# Patient Record
Sex: FEMALE | Race: WHITE | HISPANIC OR LATINO | Employment: FULL TIME | URBAN - METROPOLITAN AREA
[De-identification: names, ages, dates, MRNs, and addresses within clinical notes are randomized per-mention and may not be internally consistent; named-entity substitution may affect disease eponyms.]

---

## 2021-05-17 ENCOUNTER — TELEPHONE (OUTPATIENT)
Dept: OBGYN CLINIC | Facility: CLINIC | Age: 56
End: 2021-05-17

## 2021-05-17 NOTE — TELEPHONE ENCOUNTER
Stephen Michelle started having some brownish vaginal spotting on Saturday 05/15/21,no cramping pains  Her LMP 2019  She is currently in [place] due to her mother passing away on Friday 05/14/21  Shanae Gallardo to schedule an appt  once returns from [place]  Also go to nearest ER,if having heavy bleeding -changing a maxi pad less than every hour,fatigue,light headiness, headaches or any other concerns  Stephen Michelle verbally agreed with plan

## 2021-06-27 DIAGNOSIS — R92.8 BI-RADS CATEGORY 3 MAMMOGRAM RESULT: Primary | ICD-10-CM

## 2021-06-27 PROBLEM — M85.851 OSTEOPENIA OF RIGHT HIP: Status: ACTIVE | Noted: 2021-06-27

## 2021-11-11 PROBLEM — R92.8 BI-RADS CATEGORY 3 MAMMOGRAM RESULT: Status: ACTIVE | Noted: 2021-11-11

## 2021-11-12 ENCOUNTER — ANNUAL EXAM (OUTPATIENT)
Dept: OBGYN CLINIC | Facility: CLINIC | Age: 56
End: 2021-11-12
Payer: COMMERCIAL

## 2021-11-12 VITALS
HEIGHT: 59 IN | WEIGHT: 136.4 LBS | BODY MASS INDEX: 27.5 KG/M2 | DIASTOLIC BLOOD PRESSURE: 64 MMHG | SYSTOLIC BLOOD PRESSURE: 110 MMHG

## 2021-11-12 DIAGNOSIS — Z12.31 BREAST CANCER SCREENING BY MAMMOGRAM: ICD-10-CM

## 2021-11-12 DIAGNOSIS — Z01.419 ENCOUNTER FOR ANNUAL ROUTINE GYNECOLOGICAL EXAMINATION: Primary | ICD-10-CM

## 2021-11-12 DIAGNOSIS — R92.8 BI-RADS CATEGORY 3 MAMMOGRAM RESULT: ICD-10-CM

## 2021-11-12 DIAGNOSIS — M85.851 OSTEOPENIA OF RIGHT HIP: ICD-10-CM

## 2021-11-12 PROCEDURE — 99396 PREV VISIT EST AGE 40-64: CPT | Performed by: OBSTETRICS & GYNECOLOGY

## 2022-06-16 DIAGNOSIS — Z12.31 BREAST CANCER SCREENING BY MAMMOGRAM: ICD-10-CM

## 2022-11-11 ENCOUNTER — ANNUAL EXAM (OUTPATIENT)
Dept: OBGYN CLINIC | Facility: CLINIC | Age: 57
End: 2022-11-11

## 2022-11-11 VITALS
HEIGHT: 60 IN | WEIGHT: 137.6 LBS | SYSTOLIC BLOOD PRESSURE: 132 MMHG | DIASTOLIC BLOOD PRESSURE: 70 MMHG | BODY MASS INDEX: 27.01 KG/M2

## 2022-11-11 DIAGNOSIS — M85.851 OSTEOPENIA OF RIGHT HIP: ICD-10-CM

## 2022-11-11 DIAGNOSIS — Z80.3 FAMILY HISTORY OF BREAST CANCER IN FEMALE: ICD-10-CM

## 2022-11-11 DIAGNOSIS — Z12.31 BREAST CANCER SCREENING BY MAMMOGRAM: ICD-10-CM

## 2022-11-11 DIAGNOSIS — Z01.419 ENCOUNTER FOR ANNUAL ROUTINE GYNECOLOGICAL EXAMINATION: Primary | ICD-10-CM

## 2022-11-11 PROBLEM — R92.8 BI-RADS CATEGORY 3 MAMMOGRAM RESULT: Status: RESOLVED | Noted: 2021-11-11 | Resolved: 2022-11-11

## 2022-11-11 NOTE — PROGRESS NOTES
Annual Wellness Visit  [address]   [address], [address], [facility]    ASSESSMENT/PLAN: Jose Armando Zelaya is a 56 y o   who presents for annual gynecologic exam     Encounter for routine gynecologic examination  - Routine well woman exam completed today  - Cervical Cancer Screening: Current ASCCP Guidelines reviewed  Last Pap: 2020 normal  Next Pap Due: 1-2 years, routine   - Contraceptive counseling discussed  Current contraception: postmenopausal  - Breast Cancer Screening: Last Mammogram 2022, normal  - Colorectal cancer screening last 2022 per pt, next due   - The following were reviewed in today's visit: mammography screening ordered, menopause, adequate intake of calcium and vitamin D, exercise and healthy diet    Additional problems addressed during this visit:  1  Encounter for annual routine gynecological examination    2  Breast cancer screening by mammogram  -     Mammo screening bilateral w 3d & cad; Future; Expected date: 11/10/2023    3  Family history of breast cancer in female  -     Mammo screening bilateral w 3d & cad; Future; Expected date: 11/10/2023    4  Osteopenia of right hip  Assessment & Plan:  2021 dexa osteopenia right hip, T score -1 1  Cont Ca, Vit D, exercise  Repeat dexa 2 years  Next visit: 1 year Wellness      CC:  Annual Gynecologic Examination    HPI: Jose Armando Zelaya is a 56 y o   who presents for annual gynecologic examination  She denies any breast, urinary or pelvic issues at today's visit     + sexually active, no issues  Gyn History  No LMP recorded  Patient is postmenopausal      Last Pap: 2020 was normal    She  reports being sexually active and has had partner(s) who are male   She reports using the following method of birth control/protection: Post-menopausal        OB History      Past Medical History:  No date: Osteopenia     Past Surgical History:  No date:  SECTION      Comment:  Araceli De Jesusbrigida Evie 89     Family History   Problem Relation Age of Onset   • Colon polyps Mother    • Transient ischemic attack Mother    • Cancer Father         prostate cancer   • Hypertension Father    • Hypertension Brother    • No Known Problems Son    • No Known Problems Son    • No Known Problems Maternal Grandmother    • Cancer Maternal Grandfather    • Diabetes Paternal Grandmother    • No Known Problems Paternal Grandfather    • Breast cancer Maternal Aunt         postmeno  • Colon cancer Neg Hx         Social History     Tobacco Use   • Smoking status: Never Smoker   • Smokeless tobacco: Never Used   Vaping Use   • Vaping Use: Never used   Substance Use Topics   • Alcohol use: Yes     Alcohol/week: 4 0 standard drinks     Types: 4 Glasses of wine per week   • Drug use: Never          Current Outpatient Medications:   •  Calcium Carb-Cholecalciferol (CALCIUM CARBONATE-VITAMIN D3 PO), Take 1 tablet by mouth daily, Disp: , Rfl:     She is allergic to dog epithelium, dust mite extract, and pollen extract       ROS negative except as noted in HPI    Objective:  /70 (BP Location: Left arm, Patient Position: Sitting, Cuff Size: Standard)   Ht 4' 11 5" (1 511 m)   Wt 62 4 kg (137 lb 9 6 oz)   Breastfeeding No   BMI 27 33 kg/m²      Physical Exam  Constitutional:       Appearance: Normal appearance  Chest:   Breasts:      Right: Normal  No mass, tenderness or axillary adenopathy  Left: Normal  No mass, tenderness or axillary adenopathy  Abdominal:      Palpations: Abdomen is soft  Tenderness: There is no abdominal tenderness  Genitourinary:     General: Normal vulva  Vagina: No bleeding or lesions  Cervix: Normal       Uterus: Normal  Not tender  Adnexa:         Right: No mass or tenderness  Left: No mass or tenderness  Rectum: No mass or external hemorrhoid  Normal anal tone        Comments: Atrophic changes appropriate to age  Musculoskeletal:         General: Normal range of motion  Lymphadenopathy:      Upper Body:      Right upper body: No axillary adenopathy  Left upper body: No axillary adenopathy  Neurological:      Mental Status: She is alert and oriented to person, place, and time     Psychiatric:         Mood and Affect: Mood normal          Behavior: Behavior normal

## 2022-11-11 NOTE — LETTER
2022     Jarad Costa DO  [address]    Patient: Harper Schofield   YOB: 1965   Date of Visit: 2022       Dear Dr Fletcher Libman:    Thank you for referring Ole Duffy to me for evaluation  Below are my notes for this consultation  If you have questions, please do not hesitate to call me  I look forward to following your patient along with you  Sincerely,        Mitali Bui MD        CC: No Recipients  Mitali Bui MD  2022  3:51 PM  Sign when Signing Visit  Annual Wellness Visit  [address]   [address] [facility], [address], [place] [facility]    ASSESSMENT/PLAN: Harper Schofield is a 56 y o   who presents for annual gynecologic exam     Encounter for routine gynecologic examination  - Routine well woman exam completed today  - Cervical Cancer Screening: Current ASCCP Guidelines reviewed  Last Pap: 2020 normal  Next Pap Due: 1-2 years, routine   - Contraceptive counseling discussed  Current contraception: postmenopausal  - Breast Cancer Screening: Last Mammogram 2022, normal  - Colorectal cancer screening last 2022 per pt, next due   - The following were reviewed in today's visit: mammography screening ordered, menopause, adequate intake of calcium and vitamin D, exercise and healthy diet    Additional problems addressed during this visit:  1  Encounter for annual routine gynecological examination    2  Breast cancer screening by mammogram  -     Mammo screening bilateral w 3d & cad; Future; Expected date: 11/10/2023    3  Family history of breast cancer in female  -     Mammo screening bilateral w 3d & cad; Future; Expected date: 11/10/2023    4  Osteopenia of right hip  Assessment & Plan:  2021 dexa osteopenia right hip, T score -1 1  Cont Ca, Vit D, exercise  Repeat dexa 2 years              Next visit: 1 year Wellness      CC:  Annual Gynecologic Examination    HPI: Awa Camara Veronica Albarran is a 56 y o   who presents for annual gynecologic examination  She denies any breast, urinary or pelvic issues at today's visit     + sexually active, no issues  Gyn History  No LMP recorded  Patient is postmenopausal      Last Pap: 2020 was normal    She  reports being sexually active and has had partner(s) who are male  She reports using the following method of birth control/protection: Post-menopausal        OB History      Past Medical History:  No date: Osteopenia     Past Surgical History:  No date:  SECTION      Comment:   &      Family History   Problem Relation Age of Onset   • Colon polyps Mother    • Transient ischemic attack Mother    • Cancer Father         prostate cancer   • Hypertension Father    • Hypertension Brother    • No Known Problems Son    • No Known Problems Son    • No Known Problems Maternal Grandmother    • Cancer Maternal Grandfather    • Diabetes Paternal Grandmother    • No Known Problems Paternal Grandfather    • Breast cancer Maternal Aunt         postmeno  • Colon cancer Neg Hx         Social History     Tobacco Use   • Smoking status: Never Smoker   • Smokeless tobacco: Never Used   Vaping Use   • Vaping Use: Never used   Substance Use Topics   • Alcohol use: Yes     Alcohol/week: 4 0 standard drinks     Types: 4 Glasses of wine per week   • Drug use: Never          Current Outpatient Medications:   •  Calcium Carb-Cholecalciferol (CALCIUM CARBONATE-VITAMIN D3 PO), Take 1 tablet by mouth daily, Disp: , Rfl:     She is allergic to dog epithelium, dust mite extract, and pollen extract       ROS negative except as noted in HPI    Objective:  /70 (BP Location: Left arm, Patient Position: Sitting, Cuff Size: Standard)   Ht 4' 11 5" (1 511 m)   Wt 62 4 kg (137 lb 9 6 oz)   Breastfeeding No   BMI 27 33 kg/m²      Physical Exam  Constitutional:       Appearance: Normal appearance     Chest:   Breasts:      Right: Normal  No mass, tenderness or axillary adenopathy  Left: Normal  No mass, tenderness or axillary adenopathy  Abdominal:      Palpations: Abdomen is soft  Tenderness: There is no abdominal tenderness  Genitourinary:     General: Normal vulva  Vagina: No bleeding or lesions  Cervix: Normal       Uterus: Normal  Not tender  Adnexa:         Right: No mass or tenderness  Left: No mass or tenderness  Rectum: No mass or external hemorrhoid  Normal anal tone  Comments: Atrophic changes appropriate to age  Musculoskeletal:         General: Normal range of motion  Lymphadenopathy:      Upper Body:      Right upper body: No axillary adenopathy  Left upper body: No axillary adenopathy  Neurological:      Mental Status: She is alert and oriented to person, place, and time     Psychiatric:         Mood and Affect: Mood normal          Behavior: Behavior normal

## 2023-06-21 DIAGNOSIS — M85.851 OSTEOPENIA OF RIGHT HIP: ICD-10-CM

## 2023-07-05 DIAGNOSIS — Z80.3 FAMILY HISTORY OF BREAST CANCER IN FEMALE: ICD-10-CM

## 2023-07-05 DIAGNOSIS — Z12.31 BREAST CANCER SCREENING BY MAMMOGRAM: ICD-10-CM

## 2024-06-05 ENCOUNTER — TELEPHONE (OUTPATIENT)
Age: 59
End: 2024-06-05

## 2024-06-05 NOTE — TELEPHONE ENCOUNTER
Patient called to see if [facility] is accepted.  Will call back with insurance info.

## 2024-07-25 ENCOUNTER — ANNUAL EXAM (OUTPATIENT)
Dept: OBGYN CLINIC | Facility: CLINIC | Age: 59
End: 2024-07-25
Payer: COMMERCIAL

## 2024-07-25 VITALS
WEIGHT: 131 LBS | DIASTOLIC BLOOD PRESSURE: 66 MMHG | SYSTOLIC BLOOD PRESSURE: 112 MMHG | BODY MASS INDEX: 26.41 KG/M2 | HEIGHT: 59 IN

## 2024-07-25 DIAGNOSIS — M85.851 OSTEOPENIA OF NECK OF RIGHT FEMUR: ICD-10-CM

## 2024-07-25 DIAGNOSIS — Z12.4 CERVICAL CANCER SCREENING: ICD-10-CM

## 2024-07-25 DIAGNOSIS — Z12.31 ENCOUNTER FOR SCREENING MAMMOGRAM FOR BREAST CANCER: ICD-10-CM

## 2024-07-25 DIAGNOSIS — Z01.419 ENCOUNTER FOR ANNUAL ROUTINE GYNECOLOGICAL EXAMINATION: Primary | ICD-10-CM

## 2024-07-25 PROCEDURE — 99396 PREV VISIT EST AGE 40-64: CPT | Performed by: OBSTETRICS & GYNECOLOGY

## 2024-07-25 NOTE — LETTER
2024     Gisselle Cleveland DO  [address] [facility] [address]    Patient: Caity Bonner   YOB: 1965   Date of Visit: 2024       Dear Dr. Cleveland:    Thank you for referring Caity Bonner to me for evaluation. Below are my notes for this consultation.    If you have questions, please do not hesitate to call me. I look forward to following your patient along with you.         Sincerely,        Milagros Joseph MD        CC: No Recipients    Milagros Joseph MD  2024  3:16 PM  Sign when Signing Visit  Annual Wellness Visit  [facility] - [address], [address], [place] [address]    ASSESSMENT/PLAN: Caity Bonner is a 59 y.o.  who presents for annual gynecologic exam.    Encounter for routine gynecologic examination  - Routine well woman exam completed today.  - Cervical Cancer Screening: Current ASCCP Guidelines reviewed. Last Pap: 2020 normal. Past abnormal pap: none.  Next Pap Due: today.  - Contraceptive counseling discussed.  Current contraception: postmenopausal  - Breast Cancer Screening: Last Mammogram 2024 normal - pt brought letter from [facility]  - Colorectal cancer screening last  per pt, next due .  - The following were reviewed in today's visit: mammography screening ordered, menopause, osteoporosis, adequate intake of calcium and vitamin D, exercise, and healthy diet    Additional problems addressed during this visit:  1. Encounter for annual routine gynecological examination  2. Encounter for screening mammogram for breast cancer  -     Mammo screening bilateral w 3d & cad; Future  3. Cervical cancer screening  -     IGP, Aptima HPV, Rfx 16/18,45  4. Osteopenia of neck of right femur  Assessment & Plan:  Reviewed dexa 2023 - stable.  Continue Ca, Vit D, exercise.  Repeat 2-3 years.      Next visit: 1 year Wellness      CC:  Annual Gynecologic Examination    HPI: Caity Bonner is a 59 y.o.  who presents for  "annual gynecologic examination.  She denies any breast, urinary or pelvic issues at today's visit.    Sexually active, no issues.  No new partners.        Gyn History  No LMP recorded. Patient is postmenopausal.     Last Pap: 2020 was normal    She  reports being sexually active and has had partner(s) who are male. She reports using the following method of birth control/protection: Post-menopausal.       OB History      Past Medical History:  No date: Osteopenia     Past Surgical History:  No date:  SECTION      Comment:   &      Family History   Problem Relation Age of Onset   • Colon polyps Mother    • Transient ischemic attack Mother    • Cancer Father         prostate cancer   • Hypertension Father    • Hypertension Brother    • No Known Problems Son    • No Known Problems Son    • No Known Problems Maternal Grandmother    • Cancer Maternal Grandfather    • Diabetes Paternal Grandmother    • No Known Problems Paternal Grandfather    • Breast cancer Maternal Aunt         postmeno.    • Colon cancer Neg Hx         Social History     Tobacco Use   • Smoking status: Never   • Smokeless tobacco: Never   Vaping Use   • Vaping status: Never Used   Substance Use Topics   • Alcohol use: Yes     Alcohol/week: 4.0 standard drinks of alcohol     Types: 4 Glasses of wine per week   • Drug use: Never          Current Outpatient Medications:   •  Calcium Carb-Cholecalciferol (CALCIUM CARBONATE-VITAMIN D3 PO), Take 1 tablet by mouth daily, Disp: , Rfl:     She is allergic to dog epithelium, dust mite extract, and pollen extract..    ROS negative except as noted in HPI    Objective:  /66 (BP Location: Left arm, Patient Position: Sitting, Cuff Size: Standard)   Ht 4' 11\" (1.499 m)   Wt 59.4 kg (131 lb)   BMI 26.46 kg/m²      Physical Exam  Constitutional:       Appearance: Normal appearance.   Chest:   Breasts:     Right: Normal. No mass or tenderness.      Left: Normal. No mass or tenderness. "   Abdominal:      Palpations: Abdomen is soft.      Tenderness: There is no abdominal tenderness.   Genitourinary:     General: Normal vulva.      Vagina: No bleeding or lesions.      Cervix: Normal.      Uterus: Normal. Not tender.       Adnexa:         Right: No mass or tenderness.          Left: No mass or tenderness.        Rectum: No mass or external hemorrhoid. Normal anal tone.      Comments: Atrophic changes appropriate to age  Musculoskeletal:         General: Normal range of motion.   Lymphadenopathy:      Upper Body:      Right upper body: No axillary adenopathy.      Left upper body: No axillary adenopathy.   Neurological:      Mental Status: She is alert and oriented to person, place, and time.   Psychiatric:         Mood and Affect: Mood normal.         Behavior: Behavior normal.

## 2024-07-25 NOTE — PROGRESS NOTES
Annual Wellness Visit  [facility] - [address], [address], [place] [address]    ASSESSMENT/PLAN: Caity Bonner is a 59 y.o.  who presents for annual gynecologic exam.    Encounter for routine gynecologic examination  - Routine well woman exam completed today.  - Cervical Cancer Screening: Current ASCCP Guidelines reviewed. Last Pap: 2020 normal. Past abnormal pap: none.  Next Pap Due: today.  - Contraceptive counseling discussed.  Current contraception: postmenopausal  - Breast Cancer Screening: Last Mammogram 2024 normal - pt brought letter from [facility]  - Colorectal cancer screening last  per pt, next due .  - The following were reviewed in today's visit: mammography screening ordered, menopause, osteoporosis, adequate intake of calcium and vitamin D, exercise, and healthy diet    Additional problems addressed during this visit:  1. Encounter for annual routine gynecological examination  2. Encounter for screening mammogram for breast cancer  -     Mammo screening bilateral w 3d & cad; Future  3. Cervical cancer screening  -     IGP, Aptima HPV, Rfx 16/18,45  4. Osteopenia of neck of right femur  Assessment & Plan:  Reviewed dexa 2023 - stable.  Continue Ca, Vit D, exercise.  Repeat 2-3 years.      Next visit: 1 year Wellness      CC:  Annual Gynecologic Examination    HPI: Caity Bonner is a 59 y.o.  who presents for annual gynecologic examination.  She denies any breast, urinary or pelvic issues at today's visit.    Sexually active, no issues.  No new partners.        Gyn History  No LMP recorded. Patient is postmenopausal.     Last Pap: 2020 was normal    She  reports being sexually active and has had partner(s) who are male. She reports using the following method of birth control/protection: Post-menopausal.       OB History      Past Medical History:  No date: Osteopenia     Past Surgical History:  No date:  SECTION       "Comment:  1996 & 1998     Family History   Problem Relation Age of Onset    Colon polyps Mother     Transient ischemic attack Mother     Cancer Father         prostate cancer    Hypertension Father     Hypertension Brother     No Known Problems Son     No Known Problems Son     No Known Problems Maternal Grandmother     Cancer Maternal Grandfather     Diabetes Paternal Grandmother     No Known Problems Paternal Grandfather     Breast cancer Maternal Aunt         postmeno.     Colon cancer Neg Hx         Social History     Tobacco Use    Smoking status: Never    Smokeless tobacco: Never   Vaping Use    Vaping status: Never Used   Substance Use Topics    Alcohol use: Yes     Alcohol/week: 4.0 standard drinks of alcohol     Types: 4 Glasses of wine per week    Drug use: Never          Current Outpatient Medications:     Calcium Carb-Cholecalciferol (CALCIUM CARBONATE-VITAMIN D3 PO), Take 1 tablet by mouth daily, Disp: , Rfl:     She is allergic to dog epithelium, dust mite extract, and pollen extract..    ROS negative except as noted in HPI    Objective:  /66 (BP Location: Left arm, Patient Position: Sitting, Cuff Size: Standard)   Ht 4' 11\" (1.499 m)   Wt 59.4 kg (131 lb)   BMI 26.46 kg/m²      Physical Exam  Constitutional:       Appearance: Normal appearance.   Chest:   Breasts:     Right: Normal. No mass or tenderness.      Left: Normal. No mass or tenderness.   Abdominal:      Palpations: Abdomen is soft.      Tenderness: There is no abdominal tenderness.   Genitourinary:     General: Normal vulva.      Vagina: No bleeding or lesions.      Cervix: Normal.      Uterus: Normal. Not tender.       Adnexa:         Right: No mass or tenderness.          Left: No mass or tenderness.        Rectum: No mass or external hemorrhoid. Normal anal tone.      Comments: Atrophic changes appropriate to age  Musculoskeletal:         General: Normal range of motion.   Lymphadenopathy:      Upper Body:      Right upper body: " No axillary adenopathy.      Left upper body: No axillary adenopathy.   Neurological:      Mental Status: She is alert and oriented to person, place, and time.   Psychiatric:         Mood and Affect: Mood normal.         Behavior: Behavior normal.

## 2024-07-30 LAB
CYTOLOGIST CVX/VAG CYTO: NORMAL
DX ICD CODE: NORMAL
HPV GENOTYPE REFLEX: NORMAL
HPV I/H RISK 4 DNA CVX QL PROBE+SIG AMP: NEGATIVE
OTHER STN SPEC: NORMAL
PATH REPORT.FINAL DX SPEC: NORMAL
SL AMB NOTE:: NORMAL
SL AMB SPECIMEN ADEQUACY: NORMAL
SL AMB TEST METHODOLOGY: NORMAL

## 2025-07-09 DIAGNOSIS — Z12.31 ENCOUNTER FOR SCREENING MAMMOGRAM FOR BREAST CANCER: ICD-10-CM

## 2025-08-01 ENCOUNTER — ANNUAL EXAM (OUTPATIENT)
Dept: OBGYN CLINIC | Facility: CLINIC | Age: 60
End: 2025-08-01
Payer: MEDICARE

## 2025-08-01 VITALS
SYSTOLIC BLOOD PRESSURE: 120 MMHG | BODY MASS INDEX: 26.5 KG/M2 | DIASTOLIC BLOOD PRESSURE: 74 MMHG | HEIGHT: 60 IN | WEIGHT: 135 LBS

## 2025-08-01 DIAGNOSIS — M85.851 OSTEOPENIA OF NECK OF RIGHT FEMUR: ICD-10-CM

## 2025-08-01 DIAGNOSIS — Z78.0 MENOPAUSE: ICD-10-CM

## 2025-08-01 DIAGNOSIS — Z12.31 ENCOUNTER FOR SCREENING MAMMOGRAM FOR BREAST CANCER: ICD-10-CM

## 2025-08-01 DIAGNOSIS — Z80.3 FAMILY HISTORY OF BREAST CANCER IN FEMALE: ICD-10-CM

## 2025-08-01 DIAGNOSIS — Z01.419 ENCOUNTER FOR ANNUAL ROUTINE GYNECOLOGICAL EXAMINATION: Primary | ICD-10-CM

## 2025-08-01 PROCEDURE — 99396 PREV VISIT EST AGE 40-64: CPT | Performed by: OBSTETRICS & GYNECOLOGY
